# Patient Record
Sex: FEMALE | Race: WHITE | NOT HISPANIC OR LATINO | URBAN - NONMETROPOLITAN AREA
[De-identification: names, ages, dates, MRNs, and addresses within clinical notes are randomized per-mention and may not be internally consistent; named-entity substitution may affect disease eponyms.]

---

## 2022-07-01 ENCOUNTER — HOSPITAL ENCOUNTER (EMERGENCY)
Facility: HOSPITAL | Age: 27
Discharge: HOME | End: 2022-07-01
Attending: FAMILY MEDICINE | Admitting: FAMILY MEDICINE

## 2022-07-01 VITALS
HEIGHT: 72 IN | DIASTOLIC BLOOD PRESSURE: 79 MMHG | RESPIRATION RATE: 20 BRPM | BODY MASS INDEX: 26.41 KG/M2 | HEART RATE: 83 BPM | WEIGHT: 195 LBS | OXYGEN SATURATION: 96 % | TEMPERATURE: 97.3 F | SYSTOLIC BLOOD PRESSURE: 123 MMHG

## 2022-07-01 DIAGNOSIS — F41.9 ANXIETY: Primary | ICD-10-CM

## 2022-07-01 LAB
ALBUMIN SERPL-MCNC: 3.93 G/DL (ref 3.5–5.2)
ALBUMIN/GLOB SERPL: 1.1 G/DL
ALP SERPL-CCNC: 105 U/L (ref 39–117)
ALT SERPL W P-5'-P-CCNC: 28 U/L (ref 1–33)
AMPHET+METHAMPHET UR QL: NEGATIVE
AMPHETAMINES UR QL: NEGATIVE
ANION GAP SERPL CALCULATED.3IONS-SCNC: 14.1 MMOL/L (ref 5–15)
AST SERPL-CCNC: 25 U/L (ref 1–32)
BACTERIA UR QL AUTO: ABNORMAL /HPF
BARBITURATES UR QL SCN: NEGATIVE
BASOPHILS # BLD AUTO: 0.02 10*3/MM3 (ref 0–0.2)
BASOPHILS NFR BLD AUTO: 0.2 % (ref 0–1.5)
BENZODIAZ UR QL SCN: POSITIVE
BILIRUB SERPL-MCNC: 0.2 MG/DL (ref 0–1.2)
BILIRUB UR QL STRIP: ABNORMAL
BUN SERPL-MCNC: 10 MG/DL (ref 6–20)
BUN/CREAT SERPL: 13.3 (ref 7–25)
BUPRENORPHINE SERPL-MCNC: POSITIVE NG/ML
CALCIUM SPEC-SCNC: 9.1 MG/DL (ref 8.6–10.5)
CANNABINOIDS SERPL QL: NEGATIVE
CHLORIDE SERPL-SCNC: 103 MMOL/L (ref 98–107)
CLARITY UR: CLEAR
CO2 SERPL-SCNC: 19.9 MMOL/L (ref 22–29)
COCAINE UR QL: NEGATIVE
COLOR UR: ABNORMAL
CREAT SERPL-MCNC: 0.75 MG/DL (ref 0.57–1)
DEPRECATED RDW RBC AUTO: 41.9 FL (ref 37–54)
EGFRCR SERPLBLD CKD-EPI 2021: 111.4 ML/MIN/1.73
EOSINOPHIL # BLD AUTO: 0.11 10*3/MM3 (ref 0–0.4)
EOSINOPHIL NFR BLD AUTO: 0.9 % (ref 0.3–6.2)
ERYTHROCYTE [DISTWIDTH] IN BLOOD BY AUTOMATED COUNT: 11.8 % (ref 12.3–15.4)
ETHANOL BLD-MCNC: <10 MG/DL (ref 0–10)
ETHANOL UR QL: <0.01 %
FLUAV RNA RESP QL NAA+PROBE: NOT DETECTED
FLUBV RNA RESP QL NAA+PROBE: NOT DETECTED
GLOBULIN UR ELPH-MCNC: 3.5 GM/DL
GLUCOSE SERPL-MCNC: 96 MG/DL (ref 65–99)
GLUCOSE UR STRIP-MCNC: NEGATIVE MG/DL
HCG SERPL QL: NEGATIVE
HCT VFR BLD AUTO: 46.9 % (ref 34–46.6)
HGB BLD-MCNC: 14.9 G/DL (ref 12–15.9)
HGB UR QL STRIP.AUTO: ABNORMAL
HYALINE CASTS UR QL AUTO: ABNORMAL /LPF
IMM GRANULOCYTES # BLD AUTO: 0.04 10*3/MM3 (ref 0–0.05)
IMM GRANULOCYTES NFR BLD AUTO: 0.3 % (ref 0–0.5)
KETONES UR QL STRIP: ABNORMAL
LEUKOCYTE ESTERASE UR QL STRIP.AUTO: ABNORMAL
LYMPHOCYTES # BLD AUTO: 2.77 10*3/MM3 (ref 0.7–3.1)
LYMPHOCYTES NFR BLD AUTO: 23.1 % (ref 19.6–45.3)
MAGNESIUM SERPL-MCNC: 1.8 MG/DL (ref 1.6–2.6)
MCH RBC QN AUTO: 30.5 PG (ref 26.6–33)
MCHC RBC AUTO-ENTMCNC: 31.8 G/DL (ref 31.5–35.7)
MCV RBC AUTO: 96.1 FL (ref 79–97)
METHADONE UR QL SCN: NEGATIVE
MONOCYTES # BLD AUTO: 0.66 10*3/MM3 (ref 0.1–0.9)
MONOCYTES NFR BLD AUTO: 5.5 % (ref 5–12)
NEUTROPHILS NFR BLD AUTO: 70 % (ref 42.7–76)
NEUTROPHILS NFR BLD AUTO: 8.37 10*3/MM3 (ref 1.7–7)
NITRITE UR QL STRIP: NEGATIVE
NRBC BLD AUTO-RTO: 0 /100 WBC (ref 0–0.2)
OPIATES UR QL: NEGATIVE
OXYCODONE UR QL SCN: NEGATIVE
PCP UR QL SCN: NEGATIVE
PH UR STRIP.AUTO: 6 [PH] (ref 5–8)
PLATELET # BLD AUTO: 343 10*3/MM3 (ref 140–450)
PMV BLD AUTO: 9.3 FL (ref 6–12)
POTASSIUM SERPL-SCNC: 4.4 MMOL/L (ref 3.5–5.2)
PROPOXYPH UR QL: NEGATIVE
PROT SERPL-MCNC: 7.4 G/DL (ref 6–8.5)
PROT UR QL STRIP: NEGATIVE
RBC # BLD AUTO: 4.88 10*6/MM3 (ref 3.77–5.28)
RBC # UR STRIP: ABNORMAL /HPF
REF LAB TEST METHOD: ABNORMAL
SARS-COV-2 RNA RESP QL NAA+PROBE: NOT DETECTED
SODIUM SERPL-SCNC: 137 MMOL/L (ref 136–145)
SP GR UR STRIP: 1.02 (ref 1–1.03)
SQUAMOUS #/AREA URNS HPF: ABNORMAL /HPF
TRICYCLICS UR QL SCN: POSITIVE
UROBILINOGEN UR QL STRIP: ABNORMAL
WBC # UR STRIP: ABNORMAL /HPF
WBC NRBC COR # BLD: 11.97 10*3/MM3 (ref 3.4–10.8)

## 2022-07-01 PROCEDURE — C9803 HOPD COVID-19 SPEC COLLECT: HCPCS

## 2022-07-01 PROCEDURE — 81001 URINALYSIS AUTO W/SCOPE: CPT | Performed by: EMERGENCY MEDICINE

## 2022-07-01 PROCEDURE — 80053 COMPREHEN METABOLIC PANEL: CPT | Performed by: EMERGENCY MEDICINE

## 2022-07-01 PROCEDURE — 80306 DRUG TEST PRSMV INSTRMNT: CPT | Performed by: EMERGENCY MEDICINE

## 2022-07-01 PROCEDURE — 36415 COLL VENOUS BLD VENIPUNCTURE: CPT

## 2022-07-01 PROCEDURE — 99284 EMERGENCY DEPT VISIT MOD MDM: CPT

## 2022-07-01 PROCEDURE — 83735 ASSAY OF MAGNESIUM: CPT | Performed by: EMERGENCY MEDICINE

## 2022-07-01 PROCEDURE — 82077 ASSAY SPEC XCP UR&BREATH IA: CPT | Performed by: EMERGENCY MEDICINE

## 2022-07-01 PROCEDURE — 84703 CHORIONIC GONADOTROPIN ASSAY: CPT | Performed by: EMERGENCY MEDICINE

## 2022-07-01 PROCEDURE — 87636 SARSCOV2 & INF A&B AMP PRB: CPT | Performed by: EMERGENCY MEDICINE

## 2022-07-01 PROCEDURE — 85025 COMPLETE CBC W/AUTO DIFF WBC: CPT | Performed by: EMERGENCY MEDICINE

## 2022-07-01 NOTE — NURSING NOTE
Full intake assessment completed awaiting Lab results.Patient presented to ED via [facility] . Patient reported this morning that she was hearing her ex husbands voice . She stated at this time she is not having AVH. Patient stated she has a long history of substance use history sober for 5 months and living in the sober living community for the last 30 days. Patient denies SI,HI,alcohol,drugs, and AVH. Denies abnormalities with sleep and appetite. Patient rated anxiety 7/10 and depression 3/10.

## 2022-07-01 NOTE — ED PROVIDER NOTES
Subjective   26-year-old female presents to the emergency room with complaints of mental health evaluation.  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  She states over the past few days she has been hearing voices.  She states the voices are of her previous friend as well as her ex-boyfriend.  She states they are not threatening.  She denies suicidal homicidal ideation.  She states that he had auditory visual hallucinations in the past.  She states she is on medications for this but is unsure of the name of medication.  She denies missing any of her doses of medication.  She states that she has history of high blood pressure and she takes medication for this as well.  She denies chest pain or shortness of breath she denies nausea vomiting patient has body ache she denies recent illness.  She does report she has used methamphetamines in the past.  She denies use recently.  She states her last use was 4 months ago.      Mental Health Problem  Presenting symptoms: hallucinations    Timing:  Intermittent  Progression:  Waxing and waning  Context: not alcohol use    Treatment compliance:  All of the time  Relieved by:  Nothing  Worsened by:  Nothing  Associated symptoms: anxiety and insomnia    Associated symptoms: no abdominal pain, no chest pain, no fatigue, no feelings of worthlessness and no headaches    Risk factors: hx of mental illness        Review of Systems   Constitutional: Negative for fatigue and fever.   Cardiovascular: Negative for chest pain.   Gastrointestinal: Negative for abdominal pain.   Neurological: Negative for headaches.   Psychiatric/Behavioral: Positive for hallucinations. The patient is nervous/anxious and has insomnia.    All other systems reviewed and are negative.      Past Medical History:   Diagnosis Date   • Anxiety    • Bipolar disorder (HCC)    • Depression    • Substance abuse (HCC)        No Known Allergies    Past Surgical History:   Procedure Laterality Date   •   SECTION     • GALLBLADDER SURGERY     • TONSILLECTOMY         Family History   Problem Relation Age of Onset   • Drug abuse Father        Social History     Socioeconomic History   • Marital status: Single   Tobacco Use   • Smoking status: Current Every Day Smoker     Packs/day: 1.00     Years: 13.00     Pack years: 13.00   Substance and Sexual Activity   • Drug use: Not Currently   • Sexual activity: Not Currently     Partners: Male     Birth control/protection: None           Objective   Physical Exam  Vitals and nursing note reviewed.   Constitutional:       Appearance: She is not ill-appearing or diaphoretic.   HENT:      Head: Normocephalic and atraumatic.      Mouth/Throat:      Mouth: Mucous membranes are moist.   Eyes:      Extraocular Movements: Extraocular movements intact.      Conjunctiva/sclera: Conjunctivae normal.      Pupils: Pupils are equal, round, and reactive to light.   Cardiovascular:      Rate and Rhythm: Normal rate and regular rhythm.      Comments: No audible murmur.  2+ radial pulses bilaterally.  2+ dorsalis pedis pulses bilaterally.  Pulmonary:      Effort: Pulmonary effort is normal.      Breath sounds: Normal breath sounds.      Comments: No rhonchi's rales or wheezes  Abdominal:      General: Bowel sounds are normal.      Palpations: Abdomen is soft.      Tenderness: There is no abdominal tenderness.   Musculoskeletal:         General: Normal range of motion.      Cervical back: Neck supple.   Skin:     General: Skin is warm and dry.      Capillary Refill: Capillary refill takes less than 2 seconds.   Neurological:      General: No focal deficit present.      Mental Status: She is alert and oriented to person, place, and time.      Cranial Nerves: No cranial nerve deficit.      Sensory: No sensory deficit.      Comments: No nystagmus.  No gaze palsy.   Psychiatric:      Comments: Calm cooperative.  No active hallucinations.  Denies SI or HI.         Procedures           ED  Course  ED Course as of 07/01/22 1643   Fri Jul 01, 2022   1517 Slight elevated white blood cell 11.97. [BB]   1535 Urine drug screen positive for benzos TCAs and Suboxone.  Labs otherwise unremarkable. [BB]   1535 Patient medically clear for psychiatric evaluation. [BB]   1641 Patient seen and examined by intake provider.  Patient is noted to have no suicidal homicidal ideation.  She is not actively hallucination.  She denies hallucinations being threatening.  She denies being frightened by these as well.  Again these are not occurring at this time.  Per intake patient to be discharged to follow-up at [facility]. [BB]      ED Course User Index  [BB] Yuriy Torres MD                                           [facility]    Final diagnoses:   Anxiety       ED Disposition  ED Disposition     ED Disposition   Discharge    Condition   Stable    Comment   --             Mayco Flores MD  [address] [place] [id number]  [phone]    In 2 days           Medication List      No changes were made to your prescriptions during this visit.          Yuriy Torres MD  07/01/22 [id number]

## 2022-07-01 NOTE — NURSING NOTE
Pt presents to intake. Search completed per protocol by two staff members. All belongings collected and logged. Pt seated in safe room, will continue to monitor.

## 2022-07-01 NOTE — NURSING NOTE
Called and provided intake information including all abnormal  labs to Dr Parsons .discharge orders received.[id number]. Patient and ED provider aware.

## 2022-07-16 ENCOUNTER — HOSPITAL ENCOUNTER (EMERGENCY)
Dept: HOSPITAL 79 - ER1 | Age: 27
LOS: 1 days | Discharge: ANOTHER HOSPITAL | End: 2022-07-17
Payer: COMMERCIAL

## 2022-07-16 VITALS — BODY MASS INDEX: 27.09 KG/M2 | HEIGHT: 72 IN | WEIGHT: 200 LBS

## 2022-07-16 DIAGNOSIS — F17.200: ICD-10-CM

## 2022-07-16 DIAGNOSIS — R45.851: Primary | ICD-10-CM

## 2022-07-16 DIAGNOSIS — Z20.822: ICD-10-CM

## 2022-07-16 DIAGNOSIS — R44.0: ICD-10-CM

## 2022-07-16 LAB
BUN/CREATININE RATIO: 24 (ref 0–10)
HGB BLD-MCNC: 15.2 GM/DL (ref 12.3–15.3)
RED BLOOD COUNT: 4.89 M/UL (ref 4–5.1)
WHITE BLOOD COUNT: 10.2 K/UL (ref 4.5–11)

## 2022-07-16 PROCEDURE — U0002 COVID-19 LAB TEST NON-CDC: HCPCS

## 2022-07-17 LAB
BUN/CREATININE RATIO: 23 (ref 0–10)
HGB BLD-MCNC: 16.1 GM/DL (ref 12.3–15.3)
RED BLOOD COUNT: 5.18 M/UL (ref 4–5.1)
WHITE BLOOD COUNT: 9.2 K/UL (ref 4.5–11)

## 2022-07-24 ENCOUNTER — HOSPITAL ENCOUNTER (INPATIENT)
Dept: HOSPITAL 79 - ER1 | Age: 27
LOS: 4 days | Discharge: ANOTHER HOSPITAL | DRG: 917 | End: 2022-07-28
Attending: INTERNAL MEDICINE | Admitting: INTERNAL MEDICINE
Payer: COMMERCIAL

## 2022-07-24 VITALS — BODY MASS INDEX: 28.45 KG/M2 | HEIGHT: 72 IN | WEIGHT: 210.06 LBS

## 2022-07-24 DIAGNOSIS — G43.909: ICD-10-CM

## 2022-07-24 DIAGNOSIS — F41.9: ICD-10-CM

## 2022-07-24 DIAGNOSIS — B95.7: ICD-10-CM

## 2022-07-24 DIAGNOSIS — J18.9: ICD-10-CM

## 2022-07-24 DIAGNOSIS — G92.8: ICD-10-CM

## 2022-07-24 DIAGNOSIS — F15.10: ICD-10-CM

## 2022-07-24 DIAGNOSIS — F10.10: ICD-10-CM

## 2022-07-24 DIAGNOSIS — N30.00: ICD-10-CM

## 2022-07-24 DIAGNOSIS — Z20.822: ICD-10-CM

## 2022-07-24 DIAGNOSIS — Z90.49: ICD-10-CM

## 2022-07-24 DIAGNOSIS — I10: ICD-10-CM

## 2022-07-24 DIAGNOSIS — K21.9: ICD-10-CM

## 2022-07-24 DIAGNOSIS — E87.6: ICD-10-CM

## 2022-07-24 DIAGNOSIS — M62.82: ICD-10-CM

## 2022-07-24 DIAGNOSIS — F32.A: ICD-10-CM

## 2022-07-24 DIAGNOSIS — J98.11: ICD-10-CM

## 2022-07-24 DIAGNOSIS — T40.2X2A: Primary | ICD-10-CM

## 2022-07-24 DIAGNOSIS — F17.210: ICD-10-CM

## 2022-07-24 DIAGNOSIS — E86.0: ICD-10-CM

## 2022-07-24 LAB
BUN/CREATININE RATIO: 14 (ref 0–10)
HGB BLD-MCNC: 14.3 GM/DL (ref 12.3–15.3)
RED BLOOD COUNT: 4.59 M/UL (ref 4–5.1)
WHITE BLOOD COUNT: 12.4 K/UL (ref 4.5–11)

## 2022-07-24 PROCEDURE — G0480 DRUG TEST DEF 1-7 CLASSES: HCPCS

## 2022-07-24 PROCEDURE — U0002 COVID-19 LAB TEST NON-CDC: HCPCS

## 2022-07-25 LAB
BUN/CREATININE RATIO: 14 (ref 0–10)
HGB BLD-MCNC: 14.3 GM/DL (ref 12.3–15.3)
RED BLOOD COUNT: 4.69 M/UL (ref 4–5.1)
WHITE BLOOD COUNT: 9 K/UL (ref 4.5–11)

## 2022-07-26 LAB
BUN/CREATININE RATIO: 12 (ref 0–10)
HGB BLD-MCNC: 13.4 GM/DL (ref 12.3–15.3)
RED BLOOD COUNT: 4.41 M/UL (ref 4–5.1)
WHITE BLOOD COUNT: 7.8 K/UL (ref 4.5–11)

## 2022-07-27 LAB
BUN/CREATININE RATIO: 16 (ref 0–10)
HGB BLD-MCNC: 13.1 GM/DL (ref 12.3–15.3)
RED BLOOD COUNT: 4.39 M/UL (ref 4–5.1)
WHITE BLOOD COUNT: 8.6 K/UL (ref 4.5–11)

## 2022-07-28 LAB
BUN/CREATININE RATIO: 18 (ref 0–10)
HCV AB: >11 (ref 0–0.9)
HCV LOG10: 5.56
HEPATITIS C QUANTITATION: (no result) IU/ML
HGB BLD-MCNC: 13.6 GM/DL (ref 12.3–15.3)
RED BLOOD COUNT: 4.58 M/UL (ref 4–5.1)
WHITE BLOOD COUNT: 6.9 K/UL (ref 4.5–11)